# Patient Record
Sex: MALE | Race: BLACK OR AFRICAN AMERICAN | NOT HISPANIC OR LATINO | Employment: UNEMPLOYED | URBAN - METROPOLITAN AREA
[De-identification: names, ages, dates, MRNs, and addresses within clinical notes are randomized per-mention and may not be internally consistent; named-entity substitution may affect disease eponyms.]

---
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
## 2019-01-07 ENCOUNTER — NURSE TRIAGE (OUTPATIENT)
Dept: ADMINISTRATIVE | Facility: CLINIC | Age: 19
End: 2019-01-07

## 2019-01-07 NOTE — TELEPHONE ENCOUNTER
"    Reason for Disposition   Chlamydia, questions about    Answer Assessment - Initial Assessment Questions  1. SYMPTOMS: "Do you have any symptoms of an STD?" If so, ask: "What are they?"      none  2. TYPE: "What STD do you have questions about?"      Chalmydia  3. EXPOSURE: "Were you exposed to an STD?" If so, ask: "When and what kind of exposure?"      yes  4. PREVENTION: "Do you have questions about preventing AIDS and other STDs?"      No treatment  5. PREGNANCY: "Is there any chance you are pregnant?" "When was your last menstrual period?"      n/a    Protocols used: ST STD [id number]      "